# Patient Record
(demographics unavailable — no encounter records)

---

## 2025-04-15 NOTE — PHYSICAL EXAM
[General Appearance - Alert] : alert [General Appearance - In No Acute Distress] : in no acute distress [General Appearance - Well Developed] : well developed [General Appearance - Well-Appearing] : healthy appearing [Sclera] : the sclera and conjunctiva were normal [Extraocular Movements] : extraocular movements were intact [Outer Ear] : the ears and nose were normal in appearance [Neck Appearance] : the appearance of the neck was normal [Exaggerated Use Of Accessory Muscles For Inspiration] : no accessory muscle use [Edema] : there was no peripheral edema [Musculoskeletal - Swelling] : no joint swelling seen [] : no rash [Skin Lesions] : no skin lesions [Oriented To Time, Place, And Person] : oriented to person, place, and time [Affect] : the affect was normal [Mood] : the mood was normal

## 2025-04-15 NOTE — REVIEW OF SYSTEMS
[Feeling Tired] : feeling tired [As Noted in HPI] : as noted in HPI [Arthralgias] : arthralgias [Joint Pain] : joint pain [Joint Swelling] : joint swelling [Joint Stiffness] : joint stiffness [Negative] : Endocrine

## 2025-04-15 NOTE — ASSESSMENT
[FreeTextEntry1] : 27F no PMH, on no medications except for Vitamin D. Referred for +TJ >1:2560. Here for followup of polyarthralgias with morning stiffness, reported joint swelling, oral ulcers, hair loss and reported 30 lb weight loss since last year (partly intentional due to change in her work schedule). +Raynauds.  Of note, autoimmune workup revealed +Cummings and +RNP antibodies. Cummings abs are specific for systemic lupus; SLE is confirmed with these serologic tests as well as the above symptoms of joint pain/swelling, oral ulcers, hair loss and Raynauds.   Plan: Will start patient on HCQ- 200 mg/400 mg on alternating days based on her weight. She was advised this was the safest option given her age/demographic; She has already gotten a baseline eye exam in the last week; and I advised annual eye exams due to rare long term risk of retinal toxicity- patient aware. She was also advised potential GI upset with HCQ. I answered all questions to best of my ability regarding systemic lupus diagnosis.  She was advised even though she also has +thyroid antibodies, her TSH is normal and therefore does not require treatment for her thyroid at this time.  Patient to follow up in 3 months.

## 2025-04-15 NOTE — HISTORY OF PRESENT ILLNESS
[___ Day(s) Ago] : [unfilled] day(s) ago [FreeTextEntry1] : 4/2025 TEB followup to discuss results. +Cummings, +RNP, and +anti thyroid antibodies.  Patient with ongoing arthralgias, hx of oral ulcers, and Raynauds. No rashes or sun sensitivity.  Currently not planning pregnancy. Not sexually active and not using contraception.

## 2025-04-15 NOTE — REASON FOR VISIT
[Home] : at home, [unfilled] , at the time of the visit. [Medical Office: ([facility])___] : at the medical office located in  [Telehealth (audio & video)] : This visit was provided via telehealth using real-time 2-way audio visual technology. [Verbal consent obtained from patient] : the patient, [unfilled] [Follow-Up: _____] : a [unfilled] follow-up visit [FreeTextEntry1] : SLE

## 2025-07-22 NOTE — DATA REVIEWED
Mom reports pt. Was diagnosed with flu 12/24. Mom reports tonight while pt. Was sleeping he had increased work of breathing and appeared as if he couldn't catch his breath and wasn't making any sound with his mouth open. Pt. Seemed to improve when mom picked him up. Mom reports pt. Had done this one other time yesterday while sleeping. Pt. Does not appear in respiratory distress at present. No stridor heard, BBS clear, barky cough. Mildly hoarse cry. Pt. Alert when put on bed and wakes up. Abdomen soft and non tender. Pulses strong with brisk cap refill.   
[FreeTextEntry1] : +anti thyroid antibody +Smith ab 3.5 (H), +RNP ab 7.7 (H) in 4/2025

## 2025-07-22 NOTE — ASSESSMENT
[FreeTextEntry1] : 27F no PMH, on no medications except for Vitamin D. Referred for +TJ >1:2560. Here for followup of polyarthralgias with morning stiffness, reported joint swelling, oral ulcers, hair loss and reported 30 lb weight loss since last year (partly intentional due to change in her work schedule). +Raynauds.  Of note, autoimmune workup revealed +Cummings and +RNP antibodies. Cummings abs are specific for systemic lupus; SLE is confirmed with these serologic tests as well as the above symptoms of joint pain/swelling, oral ulcers, hair loss and Raynauds. Symptoms are improved after initiation of  mg/400 mg every other day, started in 4/2025. She reports less joint pains, no more oral ulcers, improved energy. However she has had intermittent rashes including on palm (none today); unclear if related to HCQ use or from SLE itself, or another skin condition.  Plan: Dermatology referral given for evaluation of intermittent skin rashes. Will check SLE markers including dsDNA, C3, C4, CBC, CMP. Continue  mg/400 mg on alternating days, as patient feels it is helping. Follow up in 3 months.

## 2025-07-22 NOTE — HISTORY OF PRESENT ILLNESS
[___ Month(s) Ago] : [unfilled] month(s) ago [FreeTextEntry1] : 7/2025: was started on  mg/400 mg on alternating days at last visit in 4/2025. Feeling much better in terms of joint pains, no joint swelling at this time. Energy level is better. Oral ulcers have resolved since starting HCQ. however she gets intermittent rashes including on R palm and extremities; no rash currently. not sure if it is HCQ related.

## 2025-07-22 NOTE — PHYSICAL EXAM
[General Appearance - Alert] : alert [General Appearance - In No Acute Distress] : in no acute distress [General Appearance - Well Developed] : well developed [General Appearance - Well-Appearing] : healthy appearing [Sclera] : the sclera and conjunctiva were normal [Extraocular Movements] : extraocular movements were intact [Outer Ear] : the ears and nose were normal in appearance [Neck Appearance] : the appearance of the neck was normal [Exaggerated Use Of Accessory Muscles For Inspiration] : no accessory muscle use [Edema] : there was no peripheral edema [Musculoskeletal - Swelling] : no joint swelling seen [] : no rash [Skin Lesions] : no skin lesions [No Focal Deficits] : no focal deficits [Oriented To Time, Place, And Person] : oriented to person, place, and time [Affect] : the affect was normal [Mood] : the mood was normal